# Patient Record
Sex: FEMALE | Race: AMERICAN INDIAN OR ALASKA NATIVE
[De-identification: names, ages, dates, MRNs, and addresses within clinical notes are randomized per-mention and may not be internally consistent; named-entity substitution may affect disease eponyms.]

---

## 2021-03-19 ENCOUNTER — HOSPITAL ENCOUNTER (EMERGENCY)
Dept: HOSPITAL 5 - ED | Age: 26
Discharge: HOME | End: 2021-03-19
Payer: COMMERCIAL

## 2021-03-19 VITALS — DIASTOLIC BLOOD PRESSURE: 64 MMHG | SYSTOLIC BLOOD PRESSURE: 115 MMHG

## 2021-03-19 DIAGNOSIS — R07.89: Primary | ICD-10-CM

## 2021-03-19 DIAGNOSIS — Z98.890: ICD-10-CM

## 2021-03-19 DIAGNOSIS — Z88.8: ICD-10-CM

## 2021-03-19 DIAGNOSIS — Z79.899: ICD-10-CM

## 2021-03-19 PROCEDURE — 71046 X-RAY EXAM CHEST 2 VIEWS: CPT

## 2021-03-19 PROCEDURE — 93005 ELECTROCARDIOGRAM TRACING: CPT

## 2021-03-19 NOTE — XRAY REPORT
CHEST 2 VIEWS 



INDICATION / CLINICAL INFORMATION:

CP.



COMPARISON: 

1120



FINDINGS:



SUPPORT DEVICES: None.

HEART / MEDIASTINUM: No significant abnormality. 

LUNGS / PLEURA: No significant pulmonary or pleural abnormality. No pneumothorax. 



ADDITIONAL FINDINGS: No significant additional findings.



IMPRESSION:

No significant abnormality or interval change from 8/11/2020



Signer Name: Chaim Nielson MD FACR 

Signed: 3/19/2021 2:31 PM

Workstation Name: [id number]

## 2021-03-19 NOTE — EMERGENCY DEPARTMENT REPORT
ED General Adult HPI





- General


Chief complaint: Chest Pain


Stated complaint: CHEST PAIN


Time Seen by Provider: 03/19/21 13:49


Source: patient


Mode of arrival: Ambulatory


Limitations: No Limitations





- History of Present Illness


Initial comments: 





Patient is a 25-year-old female presents emergency room complaints of midsternal

chest pain that began at 11 AM this morning.  Patient states that she has had 

this intermittently over the last several years and states that she has been 

evaluated in the emergency department in the past for this.  She states that 

last week she went to her primary care doctor for similar symptoms and that she 

also had "tense trapezius muscles" and was given a muscle relaxer for her 

symptoms which she states has been improving.  She states that she works in the 

[employer] department and frequently does heavy lifting.  She states that she presents

emergency department due to the fact that she wants to know why she keeps having

this chest pain, she has never seen a cardiologist.  She has not followed up 

with a cardiologist.  she also has a history of anxiety and does not take any 

medication and is not currently seeing a psychologist.  she denies any fall or 

injury. She denies any fever, nausea, vomiting, diarrhea, cough, hemoptysis, 

shortness of breath, pleuritic chest pain, leg swelling.  She denies any recent 

travel or recent surgery, sick contacts.  She has a past medical history of 

endometriosis and IBS.  She denies any family history of MI.  She has an adverse

reaction to naproxen but no true medication allergy.  She states her last 

menstrual cycle was last week.





- Related Data


                                  Previous Rx's











 Medication  Instructions  Recorded  Last Taken  Type


 


Diclofenac Sodium 50 mg PO TID PRN #21 tablet. 03/19/21 Unknown Rx











                                    Allergies











Allergy/AdvReac Type Severity Reaction Status Date / Time


 


naproxen [From Aleve] Allergy  Unknown Verified 08/11/20 12:58














ED Review of Systems


ROS: 


Stated complaint: CHEST PAIN


Other details as noted in HPI





Comment: All other systems reviewed and negative





ED Past Medical Hx





- Past Medical History


Previous Medical History?: Yes


Additional medical history: IBS, endometriosis





- Surgical History


Past Surgical History?: Yes


Additional Surgical History: breast reduction, oral





- Social History


Smoking Status: Never Smoker


Substance Use Type: None





- Medications


Home Medications: 


                                Home Medications











 Medication  Instructions  Recorded  Confirmed  Last Taken  Type


 


Diclofenac Sodium 50 mg PO TID PRN #21 tablet. 03/19/21  Unknown Rx














ED Physical Exam





- General


Limitations: No Limitations


General appearance: alert, in no apparent distress





- Head


Head exam: Present: atraumatic, normocephalic





- Eye


Eye exam: Present: normal appearance





- ENT


ENT exam: Present: mucous membranes moist





- Respiratory


Respiratory exam: Present: normal lung sounds bilaterally, chest wall tenderness

(reproducible midsternal chest ttp, no crepitus, no deformity, no ecchymosis).  

Absent: respiratory distress, wheezes, rales, rhonchi, stridor, accessory muscle

use, decreased breath sounds, prolonged expiratory





- Cardiovascular


Cardiovascular Exam: Present: regular rate, normal rhythm, normal heart sounds. 

Absent: systolic murmur, diastolic murmur, rubs, gallop





- Neurological Exam


Neurological exam: Present: alert, oriented X3





- Psychiatric


Psychiatric exam: Present: normal affect, normal mood





- Skin


Skin exam: Present: warm, dry, intact





ED Course


                                   Vital Signs











  03/19/21 03/19/21





  13:47 14:58


 


Temperature 98.5 F 


 


Pulse Rate 94 H 98 H


 


Respiratory 19 16





Rate  


 


Blood Pressure 121/66 


 


Blood Pressure  115/64





[Right]  


 


O2 Sat by Pulse 98 98





Oximetry  














ED Medical Decision Making





- EKG Data


EKG shows normal: sinus rhythm, axis, intervals


Rate: normal





- EKG Data





03/19/21 14:51


low voltage


no STEMI





- Radiology Data


Radiology results: report reviewed





Ordering Physician: KEL BENITEZ  


Date of Service: 03/19/21  


Procedure(s): XR chest routine 2V  


Accession Number(s): [id number]  


 


cc: KEL BENITEZ   


 


Fluoro Time In Minutes:   


 


CHEST 2 VIEWS   


 


 INDICATION / CLINICAL INFORMATION:  


 CP.  


 


 COMPARISON:   


 1120  


 


 FINDINGS:  


 


 SUPPORT DEVICES: None.  


 HEART / MEDIASTINUM: No significant abnormality.   


 LUNGS / PLEURA: No significant pulmonary or pleural abnormality. No 

pneumothorax.   


 


 ADDITIONAL FINDINGS: No significant additional findings.  


 


 IMPRESSION:  


 No significant abnormality or interval change from 8/11/2020  


 


 Signer Name: Chaim Nielson MD FACR   


 Signed: 3/19/2021 2:31 PM  


 Workstation Name: [id number]   


 


 


Transcribed By: MS  


Dictated By: Chaim Nielson MD  


Electronically Authenticated By: Chaim Nielson MD    


Signed Date/Time: 03/19/21 1431                                


 


 


 


DD/DT: 03/19/21 1431                                                            

  


TD/TT:





- Medical Decision Making





Patient is a 25-year-old female presents emergency room complaints of midsternal

 chest pain that began at 11 AM this morning.  Patient states that she has had 

this intermittently over the last several years and states that she has been 

evaluated in the emergency department in the past for this.  She states that 

last week she went to her primary care doctor for similar symptoms and that she 

also had "tense trapezius muscles" and was given a muscle relaxer for her 

symptoms which she states has been improving.  She states that she works in the 

[employer] and frequently does heavy lifting.  She states that she presents

 emergency department due to the fact that she wants to know why she keeps 

having this chest pain, she has never seen a cardiologist.  She has not followed

 up with a cardiologist.  she also has a history of anxiety and does not take 

any medication and is not currently seeing a psychologist.  she denies any fall 

or injury. She denies any fever, nausea, vomiting, diarrhea, cough, hemoptysis, 

shortness of breath, pleuritic chest pain, leg swelling.  She denies any recent 

travel or recent surgery, sick contacts.  She has a past medical history of 

endometriosis and IBS.  She denies any family history of MI.  She has an adverse

 reaction to naproxen but no true medication allergy.  She states her last 

menstrual cycle was last week. vitals are normal. on exam:reproducible 

midsternal chest ttp, no crepitus, no deformity, no ecchymosis.  EKG with low 

voltage, otherwise stable.  Chest x-ray with  No significant abnormality or 

interval change from 8/11/2020.  Symptoms appear most consistent with 

costochondritis and likely underlying anxiety.  She has no signs of acute 

psychosis or any acute anxiety at this time.  Patient is already taking a muscle

 relaxer as prescribed by her primary care physician.  We will also add an NSAID

 to help with costochondritis.  Patient will be referred to her primary care 

doctor and a cardiologist.  Her chest pain is reproducible and atypical in 

nature, do not suspect ACS.  Wells score is 0, PE very unlikely.  Discussed all 

results with patient answered questions.  Patient given prescription for 

diclofenac.  Advised patient Please take medication as prescribed as needed.  

May use ice for 15 minutes at a time, rest, heat for 15 minutes at a time, Epsom

 salt bath.  Follow-up with a primary care doctor.  Follow-up with a 

cardiologist.  Return to emergency room for new or worsening symptoms.


Critical care attestation.: 


If time is entered above; I have spent that time in minutes in the direct care 

of this critically ill patient, excluding procedure time.








ED Disposition


Clinical Impression: 


 Atypical chest pain





Disposition: DC-01 TO HOME OR SELFCARE


Is pt being admited?: No


Does the pt Need Aspirin: No


Condition: Stable


Instructions:  Costochondritis


Additional Instructions: 


Please take medication as prescribed as needed.  May use ice for 15 minutes at a

 time, rest, heat for 15 minutes at a time, Epsom salt bath.  Follow-up with a 

primary care doctor.  Follow-up with a cardiologist.  Return to emergency room 

for new or worsening symptoms.


Prescriptions: 


Diclofenac Sodium 50 mg PO TID PRN #21 tablet.dr


 PRN Reason: pain


Referrals: 


PRIMARY CARE,MD [Referring] - 2-3 Days


CASSI MENCHACA MD [Staff Physician] - 2-3 Days


Forms:  Work/School Release Form(ED)


Time of Disposition: 14:52


Print Language: ENGLISH

## 2021-03-20 NOTE — ELECTROCARDIOGRAPH REPORT
[facility]

                                       

Test Date:    2021               Test Time:    14:00:14

Pat Name:     NILSON ROSADO             Department:   

Patient ID:   [id number]          Room:          

Gender:       F                        Technician:   DORETHA

:          1995               Requested By: MONICA SETHI

Order Number: [id number]              Reading MD:   Darvin Wilson

                                 Measurements

Intervals                              Axis          

Rate:         84                       P:            43

SD:           139                      QRS:          13

QRSD:         76                       T:            17

QT:           359                                    

QTc:          425                                    

                           Interpretive Statements

Sinus rhythm

Low voltage, precordial leads

Abnormal Q suggests anterior infarct

No previous ECG available for comparison

Electronically Signed On 3- 7:34:51 PDT by Darvin Wilson

## 2021-04-16 ENCOUNTER — HOSPITAL ENCOUNTER (EMERGENCY)
Dept: HOSPITAL 5 - ED | Age: 26
Discharge: HOME | End: 2021-04-16
Payer: COMMERCIAL

## 2021-04-16 VITALS — SYSTOLIC BLOOD PRESSURE: 126 MMHG | DIASTOLIC BLOOD PRESSURE: 65 MMHG

## 2021-04-16 DIAGNOSIS — K58.9: Primary | ICD-10-CM

## 2021-04-16 DIAGNOSIS — Z98.890: ICD-10-CM

## 2021-04-16 DIAGNOSIS — Z88.8: ICD-10-CM

## 2021-04-16 DIAGNOSIS — R10.33: ICD-10-CM

## 2021-04-16 DIAGNOSIS — Z79.899: ICD-10-CM

## 2021-04-16 LAB
ALBUMIN SERPL-MCNC: 4.1 G/DL (ref 3.9–5)
ALT SERPL-CCNC: 19 UNITS/L (ref 7–56)
BACTERIA #/AREA URNS HPF: (no result) /HPF
BASOPHILS # (AUTO): 0 K/MM3 (ref 0–0.1)
BASOPHILS NFR BLD AUTO: 0.3 % (ref 0–1.8)
BILIRUB UR QL STRIP: (no result)
BLOOD UR QL VISUAL: (no result)
BUN SERPL-MCNC: 6 MG/DL (ref 7–17)
BUN/CREAT SERPL: 10 %
CALCIUM SERPL-MCNC: 9.5 MG/DL (ref 8.4–10.2)
EOSINOPHIL # BLD AUTO: 0.1 K/MM3 (ref 0–0.4)
EOSINOPHIL NFR BLD AUTO: 1.4 % (ref 0–4.3)
HCT VFR BLD CALC: 37.6 % (ref 30.3–42.9)
HEMOLYSIS INDEX: 4
HGB BLD-MCNC: 13.2 GM/DL (ref 10.1–14.3)
LYMPHOCYTES # BLD AUTO: 1.3 K/MM3 (ref 1.2–5.4)
LYMPHOCYTES NFR BLD AUTO: 19.5 % (ref 13.4–35)
MCHC RBC AUTO-ENTMCNC: 35 % (ref 30–34)
MCV RBC AUTO: 88 FL (ref 79–97)
MONOCYTES # (AUTO): 0.5 K/MM3 (ref 0–0.8)
MONOCYTES % (AUTO): 7.3 % (ref 0–7.3)
MUCOUS THREADS #/AREA URNS HPF: (no result) /HPF
PH UR STRIP: 7 [PH] (ref 5–7)
PLATELET # BLD: 394 K/MM3 (ref 140–440)
PROT UR STRIP-MCNC: (no result) MG/DL
RBC # BLD AUTO: 4.28 M/MM3 (ref 3.65–5.03)
RBC #/AREA URNS HPF: 3 /HPF (ref 0–6)
UROBILINOGEN UR-MCNC: < 2 MG/DL (ref ?–2)
WBC #/AREA URNS HPF: 1 /HPF (ref 0–6)

## 2021-04-16 PROCEDURE — 99284 EMERGENCY DEPT VISIT MOD MDM: CPT

## 2021-04-16 PROCEDURE — 36415 COLL VENOUS BLD VENIPUNCTURE: CPT

## 2021-04-16 PROCEDURE — 85025 COMPLETE CBC W/AUTO DIFF WBC: CPT

## 2021-04-16 PROCEDURE — 96374 THER/PROPH/DIAG INJ IV PUSH: CPT

## 2021-04-16 PROCEDURE — 81001 URINALYSIS AUTO W/SCOPE: CPT

## 2021-04-16 PROCEDURE — 80053 COMPREHEN METABOLIC PANEL: CPT

## 2021-04-16 PROCEDURE — 96375 TX/PRO/DX INJ NEW DRUG ADDON: CPT

## 2021-04-16 PROCEDURE — 74177 CT ABD & PELVIS W/CONTRAST: CPT

## 2021-04-16 PROCEDURE — 96361 HYDRATE IV INFUSION ADD-ON: CPT

## 2021-04-16 PROCEDURE — 81025 URINE PREGNANCY TEST: CPT

## 2021-04-16 NOTE — EMERGENCY DEPARTMENT REPORT
ED Abdominal Pain HPI





- General


Chief Complaint: Abdominal Pain


Stated Complaint: WEAKNESS/STOMACH PAINS


Time Seen by Provider: 04/16/21 09:11


Source: patient


Mode of arrival: Ambulatory


Limitations: No Limitations





- History of Present Illness


Initial Comments: 


This is a 25-year-old female who states that she is feeling better but still has

some residual abdominal pain.  She states that it is typical for her irritable 

bowel syndrome.  She states that she started having trouble on Saturday with 

constipation followed by diarrhea.  She has had no bowel movement over the last 

less than 24 hours.  She complains of some crampy abdominal discomfort in the 

periumbilical area at about 2:00 last night initially constant which later 

became more intermittent.  She states her temperature was 100 at home.  She took

some Tylenol.  She believes the symptoms are consistent with her IBS.  She does 

see a GI doctor.  She has a medication that she which she takes for IBS which 

she took on Saturday several times and also last night.  She has had no signs of

GI bleeding.





MD Complaint: abdominal pain


-: Gradual, hour(s)


Location: periumbilical


Radiation: none


Migration to: no migration


Severity: moderate


Quality: cramping


Consistency: constant, intermittent (Was constant now intermittent)


Improves With: nothing


Context: other (IBS)


Associated Symptoms: denies other symptoms, fever (See HPI).  denies: chills


Treatments Prior to Arrival: other (Tylenol and irritable bowel medication)





- Related Data


                                  Previous Rx's











 Medication  Instructions  Recorded  Last Taken  Type


 


Diclofenac Sodium 50 mg PO TID PRN #21 tablet. 03/19/21 Unknown Rx


 


traMADoL [Ultram] 50 mg PO Q6HR PRN #10 tablet 04/16/21 Unknown Rx











                                    Allergies











Allergy/AdvReac Type Severity Reaction Status Date / Time


 


naproxen [From Aleve] Allergy  Unknown Verified 08/11/20 12:58














ED Review of Systems


ROS: 


Stated complaint: WEAKNESS/STOMACH PAINS


Other details as noted in HPI





Constitutional: fever (Low-grade).  denies: chills


Eyes: denies: eye pain, vision change


ENT: denies: ear pain, throat pain


Respiratory: denies: cough, shortness of breath


Cardiovascular: denies: chest pain, palpitations


Endocrine: no symptoms reported


Gastrointestinal: abdominal pain.  denies: nausea, vomiting, diarrhea


Genitourinary: denies: urgency, dysuria, discharge


Musculoskeletal: joint swelling.  denies: back pain, arthralgia


Skin: denies: rash, lesions


Neurological: denies: headache, weakness, paresthesias


Psychiatric: denies: anxiety, depression


Hematological/Lymphatic: denies: easy bleeding, easy bruising





ED Past Medical Hx





- Past Medical History


Previous Medical History?: Yes


Additional medical history: IBS, endometriosis





- Surgical History


Past Surgical History?: Yes


Additional Surgical History: breast reduction, oral





- Social History


Smoking Status: Never Smoker


Substance Use Type: None





- Medications


Home Medications: 


                                Home Medications











 Medication  Instructions  Recorded  Confirmed  Last Taken  Type


 


Diclofenac Sodium 50 mg PO TID PRN #21 tablet. 03/19/21  Unknown Rx


 


traMADoL [Ultram] 50 mg PO Q6HR PRN #10 tablet 04/16/21  Unknown Rx














ED Physical Exam





- General


Limitations: No Limitations


General appearance: alert, in no apparent distress





- Head


Head exam: Present: atraumatic, normocephalic





- Eye


Eye exam: Present: normal appearance.  Absent: scleral icterus





- ENT


ENT exam: Present: mucous membranes moist





- Neck


Neck exam: Present: normal inspection.  Absent: meningismus





- Respiratory


Respiratory exam: Present: normal lung sounds bilaterally.  Absent: respiratory 

distress





- Cardiovascular


Cardiovascular Exam: Present: normal rhythm, tachycardia (Mildly).  Absent: 

systolic murmur, diastolic murmur, rubs, gallop





- GI/Abdominal


GI/Abdominal exam: Present: soft, normal bowel sounds.  Absent: distended, 

tenderness, guarding, rebound, rigid





- Extremities Exam


Extremities exam: Present: normal inspection





- Back Exam


Back exam: Present: normal inspection





- Neurological Exam


Neurological exam: Present: alert, oriented X3, CN II-XII intact.  Absent: motor

sensory deficit





- Psychiatric


Psychiatric exam: Present: normal affect, normal mood





- Skin


Skin exam: Present: warm, dry, intact, normal color.  Absent: rash





ED Course


                                   Vital Signs











  04/16/21





  02:38


 


Temperature 98.7 F


 


Pulse Rate 104 H


 


Respiratory 17





Rate 


 


Blood Pressure 126/65


 


O2 Sat by Pulse 97





Oximetry 














- Reevaluation(s)


Reevaluation #1: 


CT and laboratory work-up are not indicative of a need to admit the patient.  

She will be given analgesia and IV fluids.  She will be referred back to her 

gastroenterologist with appropriate return criteria.


04/16/21 09:39








ED Medical Decision Making





- Lab Data


Result diagrams: 


                                 04/16/21 02:58





                                 04/16/21 02:58








                         Laboratory Results - last 24 hr











  04/16/21 04/16/21 04/16/21





  02:58 02:58 03:15


 


WBC  6.7  


 


RBC  4.28  


 


Hgb  13.2  


 


Hct  37.6  


 


MCV  88  


 


MCH  31  


 


MCHC  35 H  


 


RDW  13.0 L  


 


Plt Count  394  


 


Lymph % (Auto)  19.5  


 


Mono % (Auto)  7.3  


 


Eos % (Auto)  1.4  


 


Baso % (Auto)  0.3  


 


Lymph # (Auto)  1.3  


 


Mono # (Auto)  0.5  


 


Eos # (Auto)  0.1  


 


Baso # (Auto)  0.0  


 


Seg Neutrophils %  71.5 H  


 


Seg Neutrophils #  4.8  


 


Sodium   134 L 


 


Potassium   4.2 


 


Chloride   99.5 


 


Carbon Dioxide   24 


 


Anion Gap   15 


 


BUN   6 L 


 


Creatinine   0.6 


 


Estimated GFR   > 60 


 


BUN/Creatinine Ratio   10 


 


Glucose   99 


 


Calcium   9.5 


 


Total Bilirubin   0.50 


 


AST   18 


 


ALT   19 


 


Alkaline Phosphatase   62 


 


Total Protein   7.4 


 


Albumin   4.1 


 


Albumin/Globulin Ratio   1.2 


 


Urine Color    Yellow


 


Urine Turbidity    Clear


 


Urine pH    7.0


 


Ur Specific [place]    1.011


 


Urine Protein    <15 mg/dl


 


Urine Glucose (UA)    Neg


 


Urine Ketones    Neg


 


Urine Blood    Neg


 


Urine Nitrite    Neg


 


Urine Bilirubin    Neg


 


Urine Urobilinogen    < 2.0


 


Ur Leukocyte Esterase    Tr


 


Urine WBC (Auto)    1.0


 


Urine RBC (Auto)    3.0


 


U Epithel Cells (Auto)    4.0


 


Urine Bacteria (Auto)    1+


 


Urine Mucus    Few


 


Urine HCG, Qual   














  04/16/21





  03:15


 


WBC 


 


RBC 


 


Hgb 


 


Hct 


 


MCV 


 


MCH 


 


MCHC 


 


RDW 


 


Plt Count 


 


Lymph % (Auto) 


 


Mono % (Auto) 


 


Eos % (Auto) 


 


Baso % (Auto) 


 


Lymph # (Auto) 


 


Mono # (Auto) 


 


Eos # (Auto) 


 


Baso # (Auto) 


 


Seg Neutrophils % 


 


Seg Neutrophils # 


 


Sodium 


 


Potassium 


 


Chloride 


 


Carbon Dioxide 


 


Anion Gap 


 


BUN 


 


Creatinine 


 


Estimated GFR 


 


BUN/Creatinine Ratio 


 


Glucose 


 


Calcium 


 


Total Bilirubin 


 


AST 


 


ALT 


 


Alkaline Phosphatase 


 


Total Protein 


 


Albumin 


 


Albumin/Globulin Ratio 


 


Urine Color 


 


Urine Turbidity 


 


Urine pH 


 


Ur Specific [place] 


 


Urine Protein 


 


Urine Glucose (UA) 


 


Urine Ketones 


 


Urine Blood 


 


Urine Nitrite 


 


Urine Bilirubin 


 


Urine Urobilinogen 


 


Ur Leukocyte Esterase 


 


Urine WBC (Auto) 


 


Urine RBC (Auto) 


 


U Epithel Cells (Auto) 


 


Urine Bacteria (Auto) 


 


Urine Mucus 


 


Urine HCG, Qual  Negative














- Radiology Data


Radiology results: report reviewed (CT the abdomen shows a hepatic hemangioma 

most likely which is an incidental finding on an otherwise normal CT)


Critical care attestation.: 


If time is entered above; I have spent that time in minutes in the direct care 

of this critically ill patient, excluding procedure time.








ED Disposition


Clinical Impression: 


Abdominal pain


Qualifiers:


 Abdominal location: periumbilical Qualified Code(s): R10.33 - Periumbilical 

pain





Irritable bowel syndrome


Qualifiers:


 Irritable bowel syndrome type: unspecified Qualified Code(s): K58.9 - Irritable

bowel syndrome without diarrhea





Disposition: DC-01 TO HOME OR SELFCARE


Is pt being admited?: No


Does the pt Need Aspirin: No


Condition: Stable


Instructions:  Abdominal Pain (ED), Abdominal Pain, Adult, Easy-to-Read, 

Irritable Bowel Syndrome, Adult


Additional Instructions: 


Follow-up with your GI physician.  Return to the emergency department any acute 

change or problem.  Check your temperature.  Return any significant fever.


Prescriptions: 


traMADoL [Ultram] 50 mg PO Q6HR PRN #10 tablet


 PRN Reason: Pain


Referrals: 


PRIMARY CARE,MD [Primary Care Provider] - 3-5 Days


Time of Disposition: 09:40

## 2021-04-16 NOTE — CAT SCAN REPORT
CT ABDOMEN AND PELVIS WITH IV CONTRAST



INDICATION:

Patient complains of abd pain with a history of I.B.S..



COMPARISON:

None available.



TECHNIQUE:

All CT scans at this facility use dose modulation, automated exposure control, iterative reconstructi
on or weight based dosing, when appropriate, to reduce radiation dose to as low as reasonably achieva
ble.



FINDINGS:



Lung Bases: No significant abnormality.



Skeletal System: No acute abnormality.



ABDOMEN:

Liver: Within the anterior liver, there is a 2.2 cm hypodensity with peripheral nodular enhancement. 
On delayed phase imaging this shows gradual fill-in. This is most likely an incidental hepatic ruth
ioma. The liver is otherwise unremarkable..

Gallbladder: No significant abnormality.  

Bile Ducts: No significant abnormality.

Pancreas: No significant abnormality.

Spleen: No significant abnormality.

Adrenals: No significant abnormality.

Right Kidney: No significant abnormality.

Left Kidney: No significant abnormality.

Upper GI tract: No significant abnormality.

Lymph Nodes: No significant adenopathy.

Aorta: No significant abnormality. 

Additional Findings: No significant abnormality.



PELVIS:

Colon:  No acute abnormality.

Urinary Bladder and Distal Ureters: No significant abnormality.

Appendix: No significant abnormality.  

Lymph Nodes: No significant adenopathy.

Additional Findings: None.





IMPRESSION:

1.  No acute process in the abdomen or pelvis.

2.  Incidental findings, as above.



Signer Name: Omar Ding MD 

Signed: 4/16/2021 6:23 AM

Workstation Name: [id number]

## 2021-11-12 ENCOUNTER — APPOINTMENT (RX ONLY)
Dept: URBAN - METROPOLITAN AREA CLINIC 33 | Facility: CLINIC | Age: 26
Setting detail: DERMATOLOGY
End: 2021-11-12

## 2021-11-12 DIAGNOSIS — L50.8 OTHER URTICARIA: ICD-10-CM

## 2021-11-12 PROCEDURE — ? TREATMENT REGIMEN

## 2021-11-12 PROCEDURE — ? PRESCRIPTION

## 2021-11-12 PROCEDURE — 99203 OFFICE O/P NEW LOW 30 MIN: CPT

## 2021-11-12 PROCEDURE — ? COUNSELING

## 2021-11-12 RX ORDER — HYDROXYZINE HYDROCHLORIDE 10 MG/1
1 TABLET, FILM COATED ORAL AS DIRECTED
Qty: 90 | Refills: 0 | Status: ERX | COMMUNITY
Start: 2021-11-12

## 2021-11-12 RX ORDER — FLUOCINONIDE 0.5 MG/G
1 OINTMENT TOPICAL BID
Qty: 30 | Refills: 1 | Status: ERX | COMMUNITY
Start: 2021-11-12

## 2021-11-12 RX ADMIN — FLUOCINONIDE 1: 0.5 OINTMENT TOPICAL at 00:00

## 2021-11-12 RX ADMIN — HYDROXYZINE HYDROCHLORIDE 1: 10 TABLET, FILM COATED ORAL at 00:00

## 2021-11-12 ASSESSMENT — LOCATION SIMPLE DESCRIPTION DERM: LOCATION SIMPLE: LEFT POPLITEAL SKIN

## 2021-11-12 ASSESSMENT — LOCATION DETAILED DESCRIPTION DERM: LOCATION DETAILED: LEFT POPLITEAL SKIN

## 2021-11-12 ASSESSMENT — LOCATION ZONE DERM: LOCATION ZONE: LEG

## 2021-11-12 NOTE — HPI: RASH
What Type Of Note Output Would You Prefer (Optional)?: Standard Output
Is The Patient Presenting As Previously Scheduled?: Yes
How Severe Is Your Rash?: mild
Is This A New Presentation, Or A Follow-Up?: Rash
Additional History: Pt here for rash on back of left leg. Pt states rash began Monday when she changed her sheets on her bed. Pt adds fiancé has itching and slight redness as well.